# Patient Record
Sex: MALE | Race: BLACK OR AFRICAN AMERICAN | Employment: UNEMPLOYED | URBAN - METROPOLITAN AREA
[De-identification: names, ages, dates, MRNs, and addresses within clinical notes are randomized per-mention and may not be internally consistent; named-entity substitution may affect disease eponyms.]

---

## 2022-07-15 ENCOUNTER — HOSPITAL ENCOUNTER (EMERGENCY)
Age: 22
Discharge: HOME | End: 2022-07-15
Payer: COMMERCIAL

## 2022-07-15 VITALS
HEART RATE: 60 BPM | RESPIRATION RATE: 16 BRPM | OXYGEN SATURATION: 99 % | DIASTOLIC BLOOD PRESSURE: 70 MMHG | TEMPERATURE: 99.3 F | SYSTOLIC BLOOD PRESSURE: 122 MMHG

## 2022-07-15 DIAGNOSIS — R11.0 NAUSEA: ICD-10-CM

## 2022-07-15 DIAGNOSIS — R51.9 ACUTE NONINTRACTABLE HEADACHE, UNSPECIFIED HEADACHE TYPE: Primary | ICD-10-CM

## 2022-07-15 PROCEDURE — 99284 EMERGENCY DEPT VISIT MOD MDM: CPT

## 2022-07-15 PROCEDURE — 6360000002 HC RX W HCPCS: Performed by: PHYSICIAN ASSISTANT

## 2022-07-15 PROCEDURE — 96372 THER/PROPH/DIAG INJ SC/IM: CPT

## 2022-07-15 PROCEDURE — 6370000000 HC RX 637 (ALT 250 FOR IP): Performed by: PHYSICIAN ASSISTANT

## 2022-07-15 RX ORDER — BUTALBITAL, ACETAMINOPHEN AND CAFFEINE 50; 325; 40 MG/1; MG/1; MG/1
1 TABLET ORAL ONCE
Status: COMPLETED | OUTPATIENT
Start: 2022-07-15 | End: 2022-07-15

## 2022-07-15 RX ORDER — ONDANSETRON 4 MG/1
4 TABLET, ORALLY DISINTEGRATING ORAL ONCE
Status: COMPLETED | OUTPATIENT
Start: 2022-07-15 | End: 2022-07-15

## 2022-07-15 RX ORDER — KETOROLAC TROMETHAMINE 30 MG/ML
30 INJECTION, SOLUTION INTRAMUSCULAR; INTRAVENOUS ONCE
Status: COMPLETED | OUTPATIENT
Start: 2022-07-15 | End: 2022-07-15

## 2022-07-15 RX ADMIN — KETOROLAC TROMETHAMINE 30 MG: 30 INJECTION, SOLUTION INTRAMUSCULAR at 22:18

## 2022-07-15 RX ADMIN — BUTALBITAL, ACETAMINOPHEN, AND CAFFEINE 1 TABLET: 50; 325; 40 TABLET ORAL at 22:20

## 2022-07-15 RX ADMIN — ONDANSETRON 4 MG: 4 TABLET, ORALLY DISINTEGRATING ORAL at 22:17

## 2022-07-15 ASSESSMENT — PAIN SCALES - GENERAL
PAINLEVEL_OUTOF10: 7
PAINLEVEL_OUTOF10: 2
PAINLEVEL_OUTOF10: 10

## 2022-07-15 ASSESSMENT — PAIN DESCRIPTION - LOCATION: LOCATION: HEAD

## 2022-07-15 ASSESSMENT — PAIN - FUNCTIONAL ASSESSMENT: PAIN_FUNCTIONAL_ASSESSMENT: 0-10

## 2022-07-15 ASSESSMENT — PAIN DESCRIPTION - DESCRIPTORS: DESCRIPTORS: ACHING

## 2022-07-15 ASSESSMENT — PAIN DESCRIPTION - PAIN TYPE: TYPE: ACUTE PAIN

## 2022-07-15 NOTE — Clinical Note
Vazquez Whiteside was seen and treated in our emergency department on 7/15/2022. He may return to work on 07/16/2022. If you have any questions or concerns, please don't hesitate to call.       Eliceo Patricio PA-C

## 2022-07-16 NOTE — ED PROVIDER NOTES
**ADVANCED PRACTICE PROVIDER, I HAVE EVALUATED THIS PATIENT**        [facility] ENCOUNTER      Pt Name: Ni Hoover  PPA:[id number]  [place] 2000  Date of evaluation: 7/15/2022  Provider: Zachery Atkinson PA-C      Chief Complaint:    Chief Complaint   Patient presents with    Headache         Nursing Notes, Past Medical Hx, Past Surgical Hx, Social Hx, Allergies, and Family Hx were all reviewed and agreed with or any disagreements were addressed in the HPI.    HPI: (Location, Duration, Timing, Severity, Quality, Assoc Sx, Context, Modifying factors)    Chief Complaint of headache since 2 PM this afternoon in the left side of the head with some nausea and decreased appetite    This is a  20 y.o. male who presents stating that he does not get headaches very often, the last one was maybe 2 years ago. He does not remember a lot of symptoms with it. He states this headache seem to come out of nowhere and made him wanted to take a nap. It was not thunderclap or worst of life. He states that he did not take any medication for it but tried to sleep it off. A couple of hours later he still was hurting and decided to come wanted to be seen in the emergency department. Patient denies any vomiting at this time. No vision change or neck pain or stiffness. Pain is worse with laying down with his right head downward and better sitting up or with his head turned towards the left. No shortness of breath or chest pain or extremity change. No recent cough congestion fevers or illness. No dizziness or confusion or mental status changes. PastMedical/Surgical History:  No past medical history on file. No past surgical history on file.     Medications:  Previous Medications    No medications on file         Review of Systems:  (2-9 systems needed)  Review of Systems  Positive history as above with symptoms as above, no fever or chills, no runny or stuffy nose or earache or decreased hearing. No neck pain or stiffness sore throat or difficulty swallowing. No shortness of breath or chest pain abdominal pain vomiting or diarrhea or extremity acute change. No rash. \"Positives and Pertinent negatives as per HPI\"    Physical Exam:  Physical Exam  Vitals and nursing note reviewed. Constitutional:       General: He is not in acute distress. Appearance: Normal appearance. He is not toxic-appearing or diaphoretic. Comments: Patient smiling, playing on cell phone when I came into the room. Very alert, reasonably good historian   HENT:      Head: Normocephalic and atraumatic. Right Ear: External ear normal.      Left Ear: External ear normal.      Nose: Nose normal.      Mouth/Throat:      Mouth: Mucous membranes are moist.      Pharynx: No posterior oropharyngeal erythema. Eyes:      General:         Right eye: No discharge. Left eye: No discharge. Conjunctiva/sclera: Conjunctivae normal.   Cardiovascular:      Rate and Rhythm: Normal rate and regular rhythm. Pulses: Normal pulses. Heart sounds: Normal heart sounds. No murmur heard. No gallop. Pulmonary:      Effort: Pulmonary effort is normal. No respiratory distress. Breath sounds: Normal breath sounds. No wheezing, rhonchi or rales. Musculoskeletal:         General: Normal range of motion. Cervical back: Normal range of motion and neck supple. No rigidity or tenderness. Lymphadenopathy:      Cervical: No cervical adenopathy. Skin:     General: Skin is warm and dry. Capillary Refill: Capillary refill takes less than 2 seconds. Findings: No rash. Neurological:      Mental Status: He is alert and oriented to person, place, and time. Mental status is at baseline. Sensory: No sensory deficit. Motor: No weakness.       Coordination: Coordination normal.      Gait: Gait normal.   Psychiatric:         Mood and Affect: Mood normal. Behavior: Behavior normal.       MEDICAL DECISION MAKING    Vitals:    Vitals:    07/15/22 2006 07/15/22 2319   BP: 132/88 122/70   Pulse: 70 60   Resp: 18 16   Temp: 98.9 °F (37.2 °C) 99.3 °F (37.4 °C)   TempSrc: Oral Oral   SpO2: 99% 99%       LABS:Labs Reviewed - No data to display       RADIOLOGY:   Non-plain film images such as CT, Ultrasound and MRI are read by the radiologist. Enedina Granados PA-C have directly visualized the radiologic plain film image(s) with the below findings:      Interpretation per the Radiologist below, if available at the time of this note:    No orders to display        No results found. MEDICAL DECISION MAKING / ED COURSE:      PROCEDURES:   Procedures    None    Patient was given:  Medications   ketorolac (TORADOL) injection 30 mg (30 mg IntraMUSCular Given 7/15/22 2218)   ondansetron (ZOFRAN-ODT) disintegrating tablet 4 mg (4 mg Oral Given 7/15/22 2217)   butalbital-acetaminophen-caffeine (FIORICET, ESGIC) per tablet 1 tablet (1 tablet Oral Given 7/15/22 2220)     This patient is has a spot evaluation and treatment is begun here. Vital signs good at this time. Patient denies recent fevers or illness, associated with this and no medication tried at home to help with his symptoms. Medications for comfort are discussed and agreed upon with the patient with the idea that we will recheck after the medications have been given to see how he is doing. A little over an hour after his medications are given patient calls out and indicates that his pain is very much improved, maybe a 2 out of 10 now when he really feels like he like to be discharged home now. This is appropriate care. No indication or suspicion of intracranial injury or bleed or mass or infection among other severe issues. Conservative home care instructions now given and patient will be discharged as follows. Home to drink plenty fluids, rest, and monitor for gradual improvement.   May use over-the-counter medication such as Tylenol or ibuprofen for comfort if needed per box directions. Follow-up outpatient with your family doctor in a couple of days for recheck. Return to the emergency department for any emergency worsening or concern    The patient tolerated their visit well. I evaluated the patient. The physician was available for consultation as needed. The patient and / or the family were informed of the results of any tests, a time was given to answer questions, a plan was proposed and they agreed with plan. CLINICAL IMPRESSION:  1. Acute nonintractable headache, unspecified headache type    2.  Nausea        DISPOSITION Decision To Discharge 07/15/2022 11:22:55 PM      PATIENT REFERRED TO:  [facility]) Pre-Services  [phone]          DISCHARGE MEDICATIONS:  New Prescriptions    No medications on file       DISCONTINUED MEDICATIONS:  Discontinued Medications    No medications on file              (Please note the MDM and HPI sections of this note were completed with a voice recognition program.  Efforts were made to edit the dictations but occasionally words are mis-transcribed.)    Electronically signed, Maria R Levine PA-C,           Maria R Levine PA-C  07/16/22 3218

## 2022-07-16 NOTE — ED NOTES
.Pt discharged at this time. Discharge instructions and medications reviewed,  Questions were answered. PT verbalized understanding. VSS, Afebrile. Follow up appointments were discussed.          [facility], [address] [place] [address]  07/15/22 9572

## 2022-07-16 NOTE — DISCHARGE INSTRUCTIONS
Home to drink plenty fluids, rest, and monitor for gradual improvement. May use over-the-counter medication such as Tylenol or ibuprofen for comfort if needed per box directions. Follow-up outpatient with your family doctor in a couple of days for recheck.   Return to the emergency department for any emergency worsening or concern

## 2023-07-29 ENCOUNTER — HOSPITAL ENCOUNTER (EMERGENCY)
Age: 23
Discharge: HOME | End: 2023-07-29

## 2023-07-29 VITALS
HEIGHT: 78 IN | HEART RATE: 66 BPM | RESPIRATION RATE: 16 BRPM | OXYGEN SATURATION: 100 % | SYSTOLIC BLOOD PRESSURE: 117 MMHG | TEMPERATURE: 98.6 F | BODY MASS INDEX: 19.72 KG/M2 | WEIGHT: 170.42 LBS | DIASTOLIC BLOOD PRESSURE: 79 MMHG

## 2023-07-29 DIAGNOSIS — T63.481A ALLERGIC REACTION TO INSECT STING, ACCIDENTAL OR UNINTENTIONAL, INITIAL ENCOUNTER: ICD-10-CM

## 2023-07-29 DIAGNOSIS — L50.9 URTICARIA: Primary | ICD-10-CM

## 2023-07-29 PROCEDURE — 96374 THER/PROPH/DIAG INJ IV PUSH: CPT

## 2023-07-29 PROCEDURE — 99284 EMERGENCY DEPT VISIT MOD MDM: CPT

## 2023-07-29 PROCEDURE — 6360000002 HC RX W HCPCS: Performed by: PHYSICIAN ASSISTANT

## 2023-07-29 PROCEDURE — 2500000003 HC RX 250 WO HCPCS: Performed by: PHYSICIAN ASSISTANT

## 2023-07-29 PROCEDURE — 96375 TX/PRO/DX INJ NEW DRUG ADDON: CPT

## 2023-07-29 PROCEDURE — 2580000003 HC RX 258: Performed by: PHYSICIAN ASSISTANT

## 2023-07-29 PROCEDURE — A4216 STERILE WATER/SALINE, 10 ML: HCPCS | Performed by: PHYSICIAN ASSISTANT

## 2023-07-29 RX ORDER — DEXAMETHASONE SODIUM PHOSPHATE 4 MG/ML
10 INJECTION, SOLUTION INTRA-ARTICULAR; INTRALESIONAL; INTRAMUSCULAR; INTRAVENOUS; SOFT TISSUE ONCE
Status: COMPLETED | OUTPATIENT
Start: 2023-07-29 | End: 2023-07-29

## 2023-07-29 RX ORDER — PREDNISONE 20 MG/1
40 TABLET ORAL
Qty: 10 TABLET | Refills: 0 | Status: SHIPPED | OUTPATIENT
Start: 2023-07-29 | End: 2023-08-03

## 2023-07-29 RX ORDER — DIPHENHYDRAMINE HYDROCHLORIDE 50 MG/ML
25 INJECTION INTRAMUSCULAR; INTRAVENOUS ONCE
Status: COMPLETED | OUTPATIENT
Start: 2023-07-29 | End: 2023-07-29

## 2023-07-29 RX ADMIN — DEXAMETHASONE SODIUM PHOSPHATE 10 MG: 4 INJECTION, SOLUTION INTRAMUSCULAR; INTRAVENOUS at 14:27

## 2023-07-29 RX ADMIN — DIPHENHYDRAMINE HYDROCHLORIDE 25 MG: 50 INJECTION, SOLUTION INTRAMUSCULAR; INTRAVENOUS at 14:29

## 2023-07-29 RX ADMIN — FAMOTIDINE 20 MG: 10 INJECTION, SOLUTION INTRAVENOUS at 14:33

## 2023-07-29 ASSESSMENT — PAIN SCALES - GENERAL
PAINLEVEL_OUTOF10: 0

## 2023-07-29 ASSESSMENT — PAIN - FUNCTIONAL ASSESSMENT: PAIN_FUNCTIONAL_ASSESSMENT: 0-10

## 2023-07-29 NOTE — DISCHARGE INSTRUCTIONS
Home in stable condition to keep skin cool, use the prednisone as written and fill the prescription for EpiPen's and keep them near you in case you have a acute worsening or life-threatening reaction. Also use over-the-counter Benadryl for itching as it reoccurs. Monitor for gradual improvement. Follow with family doctor for further care. Return to the ER for any emergency worsening or concern.

## 2023-07-29 NOTE — ED NOTES
D/C: Order noted for d/c. Pt confirmed d/c paperwork  have correct name. Discharge and education instructions reviewed with patient. Teach-back successful. Pt verbalized understanding and signed d/c papers. Pt denied questions at this time. No acute distress noted. Patient instructed to follow-up as noted - return to emergency department if symptoms worsen. Patient verbalized understanding. Discharged per EDMD with discharge instructions. Pt discharged to private vehicle. Patient stable upon departure. Thanked patient for choosing [facility]) for care. Provider aware of patient pain at time of discharge.        Nessa Jessica RN  07/29/23 0687

## 2023-07-29 NOTE — ED PROVIDER NOTES
**ADVANCED PRACTICE PROVIDER, I HAVE EVALUATED THIS PATIENT**        [address] [facility] [address] [address]      Pt Name: Nancy Kumar  YTQ:[id number]  [address] [facility] 2000  Date of evaluation: 7/29/2023  Provider: Evens Rios PA-C  Note Started: 4:00 PM EDT 7/29/2023        Chief Complaint:    Chief Complaint   Patient presents with    Allergic Reaction     Stung by bees in bilateral ankles. Given IV benadryl by EMS. Denies allergy or previous reaction. Pt presents with redness and bumps elsewhere. Nursing Notes, Past Medical Hx, Past Surgical Hx, Social Hx, Allergies, and Family Hx were all reviewed and agreed with or any disagreements were addressed in the HPI.    HPI: (Location, Duration, Timing, Severity, Quality, Assoc Sx, Context, Modifying factors)    History From: Patient          Chief Complaint of multiple stings from ground hornets when mowing the lawn within the last hour or so    This is a  24 y.o. male who presents stating that he has never had a reaction like this before but also has never had multiple stings at the same time. He states that he was mowing the lawn and all of a sudden the hornets swarmed his ankles stinging him multiple times in each ankle. He states the ankles are tender but he is more concerned because his whole body developed a red itchy rash on the right away and swelling and he has never had this before. No other associated symptoms. They called 911 and the EMS personnel did give him some Benadryl and started align. PastMedical/Surgical History:  History reviewed. No pertinent past medical history. History reviewed. No pertinent surgical history.     Medications:  Previous Medications    No medications on file       Review of Systems:  (1 systems needed)  Review of Systems  Positive history as above with no mouth lip throat or tongue swelling or difficulty breathing or swallowing nausea or vomiting headache or